# Patient Record
Sex: MALE | Race: WHITE | URBAN - NONMETROPOLITAN AREA
[De-identification: names, ages, dates, MRNs, and addresses within clinical notes are randomized per-mention and may not be internally consistent; named-entity substitution may affect disease eponyms.]

---

## 2017-06-21 ENCOUNTER — AMBULATORY - GICH (OUTPATIENT)
Dept: SCHEDULING | Facility: OTHER | Age: 62
End: 2017-06-21

## 2017-09-13 ENCOUNTER — COMMUNICATION - GICH (OUTPATIENT)
Dept: FAMILY MEDICINE | Facility: OTHER | Age: 62
End: 2017-09-13

## 2017-09-13 DIAGNOSIS — E78.49 OTHER HYPERLIPIDEMIA: ICD-10-CM

## 2017-09-14 ENCOUNTER — AMBULATORY - GICH (OUTPATIENT)
Dept: LAB | Facility: OTHER | Age: 62
End: 2017-09-14

## 2017-09-14 DIAGNOSIS — Z79.899 OTHER LONG TERM (CURRENT) DRUG THERAPY: ICD-10-CM

## 2017-09-14 LAB
ABSOLUTE BASOPHILS - HISTORICAL: 0 THOU/CU MM
ABSOLUTE EOSINOPHILS - HISTORICAL: 0 THOU/CU MM
ABSOLUTE IMMATURE GRANULOCYTES(METAS,MYELOS,PROS) - HISTORICAL: 0 THOU/CU MM
ABSOLUTE LYMPHOCYTES - HISTORICAL: 1.9 THOU/CU MM (ref 0.9–2.9)
ABSOLUTE MONOCYTES - HISTORICAL: 0.6 THOU/CU MM
ABSOLUTE NEUTROPHILS - HISTORICAL: 3.4 THOU/CU MM (ref 1.7–7)
ALBUMIN SERPL-MCNC: 3.9 G/DL (ref 3.5–5.7)
ALT (SGPT) - HISTORICAL: 31 IU/L (ref 7–52)
BASOPHILS # BLD AUTO: 0.5 %
C-REACTIVE PROTEIN - HISTORICAL: <1 MG/DL
CREAT SERPL-MCNC: 1.08 MG/DL (ref 0.7–1.3)
EOSINOPHIL NFR BLD AUTO: 0.3 %
ERYTHROCYTE [DISTWIDTH] IN BLOOD BY AUTOMATED COUNT: 12.5 % (ref 11.5–15.5)
ERYTHROCYTE [SEDIMENTATION RATE] IN BLOOD: 5 MM/HR
GFR IF NOT AFRICAN AMERICAN - HISTORICAL: >60 ML/MIN/1.73M2
HCT VFR BLD AUTO: 47.8 % (ref 37–53)
HEMOGLOBIN: 16.4 G/DL (ref 13.5–17.5)
IMMATURE GRANULOCYTES(METAS,MYELOS,PROS) - HISTORICAL: 0.5 %
LYMPHOCYTES NFR BLD AUTO: 32.4 % (ref 20–44)
MCH RBC QN AUTO: 31.9 PG (ref 26–34)
MCHC RBC AUTO-ENTMCNC: 34.3 G/DL (ref 32–36)
MCV RBC AUTO: 93 FL (ref 80–100)
MONOCYTES NFR BLD AUTO: 9.6 %
NEUTROPHILS NFR BLD AUTO: 56.7 % (ref 42–72)
PLATELET # BLD AUTO: 203 THOU/CU MM (ref 140–440)
PMV BLD: 9.3 FL (ref 6.5–11)
RED BLOOD COUNT - HISTORICAL: 5.14 MIL/CU MM (ref 4.3–5.9)
WHITE BLOOD COUNT - HISTORICAL: 5.9 THOU/CU MM (ref 4.5–11)

## 2017-09-19 ENCOUNTER — OFFICE VISIT - GICH (OUTPATIENT)
Dept: FAMILY MEDICINE | Facility: OTHER | Age: 62
End: 2017-09-19

## 2017-09-19 ENCOUNTER — HISTORY (OUTPATIENT)
Dept: FAMILY MEDICINE | Facility: OTHER | Age: 62
End: 2017-09-19

## 2017-09-19 DIAGNOSIS — Z00.00 ENCOUNTER FOR GENERAL ADULT MEDICAL EXAMINATION WITHOUT ABNORMAL FINDINGS: ICD-10-CM

## 2017-09-19 DIAGNOSIS — E78.49 OTHER HYPERLIPIDEMIA: ICD-10-CM

## 2017-09-19 DIAGNOSIS — M06.042: ICD-10-CM

## 2017-09-19 DIAGNOSIS — M06.041: ICD-10-CM

## 2017-09-19 LAB
CHOL/HDL RATIO - HISTORICAL: 3.71
CHOLESTEROL TOTAL: 156 MG/DL
GLUCOSE SERPL-MCNC: 95 MG/DL (ref 70–105)
HDLC SERPL-MCNC: 42 MG/DL (ref 23–92)
LDLC SERPL CALC-MCNC: 85 MG/DL
NON-HDL CHOLESTEROL - HISTORICAL: 114 MG/DL
PROVIDER ORDERDED STATUS - HISTORICAL: NORMAL
PSA TOTAL (DIAGNOSTIC) - HISTORICAL: 1.14 NG/ML
TRIGL SERPL-MCNC: 144 MG/DL

## 2017-12-28 NOTE — PROGRESS NOTES
Patient Information     Patient Name MRN Sex Keo Cervantes [id number] Male 1955      Progress Notes by Lior Connors MD at 2017  9:02 AM     Author:  Lior Connors MD Service:  (none) Author Type:  Physician     Filed:  2017 12:42 PM Encounter Date:  2017 Status:  Signed     :  Lior Connors MD (Physician)              SUBJECTIVE:    Keo Oh is a 62 y.o. male who presents for px    HPI: Overall is doing well.  No concerns.  Needs refills.  Plans on retiring in atrial fibrillation few months.  Colonoscopy is now on a every 2 year plan.  Brother  from prostate cancer at age 72 or so.  Patient wants a PSA.    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        COLONOSCOPY SCREENING       TN LAP APPENDECTOMY  2014              SOCIAL HISTORY:  Social History     Social History        Marital status:       Spouse name: N/A     Number of children:  N/A     Years of education:  N/A     Occupational History      Not on file.     Social History Main Topics          Smoking status:   Former Smoker      Quit date:  2008      Smokeless tobacco:   Never Used      Alcohol use   Yes      Comment: a case of beer a week       Drug use:   No      Sexual activity:   Not on file      Other Topics  Concern     Not on file      Social History  Narrative     He is , has two sons, Stephen and eRne, who played for the [employer] baseball team.      He is a [employer] employee        **pre upload 12/12/2012**     FAMILY HISTORY:  Family History      Problem  Relation Age of Onset     Cancer-prostate Father      Stroke Father      Cancer-prostate Brother 65     Cancer-breast Mother       CURRENT MEDICATIONS:   Current Outpatient Prescriptions       Medication  Sig Dispense Refill     aspirin (ECOTRIN) 81 mg enteric coated tablet Take 1 tablet by mouth once daily with a meal.  0     atorvastatin (LIPITOR) 80 mg tablet Take 1 tablet by mouth once daily. 90 tablet 3     CALCIUM CARBONATE/VITAMIN D2 (CALCIUM 600 + D ORAL) Take 1 Tab by mouth once daily.       Cholecalciferol, Vitamin D3, (VITAMIN D-3) 2,000 unit tablet Take 2,000 Units by mouth once daily.       naproxen (NAPROSYN) 250 mg tablet Take 500 mg by mouth 2 times daily with meals.       omega-3 fatty acids-vitamin E (FISH OIL) 1,000 mg cap Take 1 capsule by mouth once daily.       sulfaSALAzine (AZULFIDINE) 500 mg tablet Take 2 tablets by mouth 2 times daily.  0     No current facility-administered medications for this visit.      Medications have been reviewed by me and are current to the best of my knowledge and ability.    ALLERGIES:  Review of patient's allergies indicates no known allergies.    REVIEW OF SYSTEMS:  General: denies any general problems.  Eyes: denies problems  Ears/Nose/Throat: denies problems  Cardiovascular: denies problems  Respiratory: denies problems  Gastrointestinal: denies problems  Genitourinary: denies problems  Musculoskeletal: denies problems  Musculoskeletal: mild aching, he feels is from the rheumatoid arthritis.  Much more active now on the sulfasalazine.  This is managed by Rheumatology.  Skin: denies problems  Neurologic: denies problems  Psychiatric: denies problems  Endocrine: denies problems  Heme/Lymphatic: denies problems  Allergic/Immunologic: denies  problems  PHQ Depression Screening 9/19/2017   Date of PHQ exam (doc flow) 9/19/2017   1. Lack of interest/pleasure 0 - Not at all   2. Feeling down/depressed 0 - Not at all   PHQ-2 TOTAL SCORE 0   Some recent data might be hidden       OBJECTIVE:  /76  Pulse 64  Resp 16  Ht 1.829 m (6')  Wt 121.4 kg (267 lb 9.6 oz)  BMI 36.29 kg/m2  EXAM:   General Appearance: Pleasant, alert, appropriate appearance for age. No acute distress  Head Exam: Normal. Normocephalic, atraumatic.  Eye Exam:  Normal external eye, conjunctiva, lids, cornea. MANDIE.  Ear Exam: Normal TM's bilaterally. Normal auditory canals and external ears. Non-tender.  Nose Exam: Normal external nose, mucus membranes, and septum.  OroPharynx Exam:  Dental hygiene adequate. Normal buccal mucosa. Normal pharynx.  Neck Exam:  Supple, no masses or nodes.  Thyroid Exam: No nodules or enlargement.  Chest/Respiratory Exam: Normal chest wall and respirations. Clear to auscultation.  Cardiovascular Exam: Regular rate and rhythm. S1, S2, no murmur, click, gallop, or rubs.  Gastrointestinal Exam: Soft, non-tender, no masses or organomegaly.  Rectal Exam: Normal sphincter tone. No masses noted.  Lymphatic Exam: Non-palpable nodes in neck, clavicular, axillary, or inguinal regions.  Musculoskeletal Exam: Back is straight and non-tender, full ROM of upper and lower extremities.  Skin: no rash or abnormalities  Neurologic Exam: Nonfocal, symmetric DTRs, normal gross motor, tone coordination and no tremor.  Psychiatric Exam: Alert and oriented - appropriate affect.    ASSESSMENT/PLAN:    ICD-10-CM    1. Routine general medical examination at a health care facility Z00.00 PSA, TOTAL      GLUCOSE, FASTING      LIPID PANEL      FLU VACCINE => 3 YRS PF QUADRIVALENT IIV4 IM   2. Other hyperlipidemia E78.4 atorvastatin (LIPITOR) 80 mg tablet   3. Rheumatoid arthritis involving both hands with negative rheumatoid factor (HC) M06.041      M06.042      Mr. Oh's Body  mass index is 36.29 kg/(m^2). This is out of the normal range for a 62 y.o. Normal range for ages 18+ is between 18.5 and 24.9. To lose weight we reviewed risks and benefits of appropriate options such as diet, exercise, and medications. Patient's strategy will be  none; patient is not ready to act  BP Readings from Last 1 Encounters:09/19/17 : 132/76  Mr. Salinas blood pressure is out of the normal range for adults. Per JNC-8 guidelines normal adult blood pressure is < 120/80, pre-hypertensive is between 120/80 and 139/89, and hypertension is 140/90 or greater. Risks of hypertension were discussed. Patient's strategy will be to recheck blood pressure in 12 months    Lior Connors MD ....................  9/19/2017   9:15 AM

## 2017-12-28 NOTE — TELEPHONE ENCOUNTER
Patient Information     Patient Name MRN Keo Montgomery [id number] Male 1955      Telephone Encounter by Sophia Pradhan RN at 2017  4:17 PM     Author:  Sophia Pradhan RN Service:  (none) Author Type:  NURS- Registered Nurse     Filed:  2017  4:23 PM Encounter Date:  2017 Status:  Signed     :  Sophia Pradhan RN (NURS- Registered Nurse)            Statins    Office visit in the past 12 months.    Last visit with BATSHEVA LEIGH was on: 10/20/2015 in [place] [facility]  Next visit with BATSHEVA LEIGH is on: 2017 in [place] [facility]  Next visit with Family Practice is on: 2017 in [facility]    Lab testing requirements:  Lipids annually.  Repeat lipids 6-8 weeks after dosage or drug change.    Last Lipids:  Chol: 256    10/6/2016  T    10/6/2016  HDL:   39    10/6/2016  LDL:  161    10/6/2016  LDL DIRECT:  No results found in past 5 years    .    Concommitant use of fibrates and statins-If it is an addition to the medication list, review note and/or discuss with provider.  If already on medication list, refill.    Max refills 12 months from last office visit.    Pt was to follow up in 6 to 8 weeks after starting Lipitor and this was almost a year ago.RX was a year ago on  for 90 x 3. Limited refill given to pt with a note to see MD to pharmacy for medication. SOPHIA PRADHAN RN ....................  2017   4:21 PM Prescription refilled per RN Medication Refill Policy.................... SOPHIA PRADHAN RN ....................  2017   4:21 PM

## 2017-12-30 NOTE — NURSING NOTE
Patient Information     Patient Name MRN Sex Keo Cervantes N [id number] Male 1955      Nursing Note by Carlie Ragland at 2017  8:45 AM     Author:  Carlie Ragland Service:  (none) Author Type:  (none)     Filed:  2017  8:59 AM Encounter Date:  2017 Status:  Signed     :  Carlie Ragland            Physical with lab work, is fasting  Carlie Ragland ....................  2017   8:51 AM

## 2018-01-27 VITALS
SYSTOLIC BLOOD PRESSURE: 132 MMHG | HEIGHT: 72 IN | WEIGHT: 267.6 LBS | BODY MASS INDEX: 36.24 KG/M2 | RESPIRATION RATE: 16 BRPM | HEART RATE: 64 BPM | DIASTOLIC BLOOD PRESSURE: 76 MMHG

## 2018-01-30 ENCOUNTER — DOCUMENTATION ONLY (OUTPATIENT)
Dept: FAMILY MEDICINE | Facility: OTHER | Age: 63
End: 2018-01-30

## 2018-01-30 PROBLEM — E66.3 OVERWEIGHT: Status: ACTIVE | Noted: 2018-01-30

## 2018-01-30 PROBLEM — C18.9 MALIGNANT TUMOR OF COLON (H): Status: ACTIVE | Noted: 2018-01-30

## 2018-01-30 PROBLEM — E78.5 HYPERLIPIDEMIA: Status: ACTIVE | Noted: 2018-01-30

## 2018-01-30 PROBLEM — F17.200 NICOTINE ADDICTION: Status: ACTIVE | Noted: 2018-01-30

## 2018-01-30 RX ORDER — SULFASALAZINE 500 MG/1
1000 TABLET ORAL 2 TIMES DAILY
COMMUNITY
Start: 2017-09-19

## 2018-01-30 RX ORDER — NAPROXEN 250 MG/1
500 TABLET ORAL
COMMUNITY

## 2018-01-30 RX ORDER — ASPIRIN 81 MG/1
81 TABLET ORAL
COMMUNITY
Start: 2015-11-24

## 2018-01-30 RX ORDER — CHOLECALCIFEROL (VITAMIN D3) 50 MCG
2000 TABLET ORAL DAILY
COMMUNITY

## 2018-01-30 RX ORDER — ATORVASTATIN CALCIUM 80 MG/1
80 TABLET, FILM COATED ORAL DAILY
COMMUNITY
Start: 2017-09-19 | End: 2018-10-01

## 2018-01-30 RX ORDER — CHLORAL HYDRATE 500 MG
1 CAPSULE ORAL
COMMUNITY

## 2018-07-23 NOTE — PROGRESS NOTES
Patient Information     Patient Name  Keo Oh MRN  [id number] Sex  Male   1955      Letter by Lior Connors MD at      Author:  Lior Connors MD Service:  (none) Author Type:  (none)    Filed:   Encounter Date:  2017 Status:  (Other)           Keo Oh  [address] [facility] [address]          2017    Dear Mr. Oh:    Following are the tests completed during your last clinic visit.  The results of these tests are normal and require no further attention unless otherwise noted.  The cholesterol is now half of what it was last year.    Results for orders placed or performed in visit on 17      PSA, TOTAL      Result  Value Ref Range    PSA TOTAL (DIAGNOSTIC) 1.136 <=3.100 ng/mL   GLUCOSE, FASTING      Result  Value Ref Range    GLUCOSE 95 70 - 105 mg/dL   LIPID PANEL      Result  Value Ref Range    CHOLESTEROL,TOTAL 156 <200 mg/dL    TRIGLYCERIDES 144 <150 mg/dL    HDL CHOLESTEROL 42 23 - 92 mg/dL    NON-HDL CHOLESTEROL 114 <145 mg/dl    CHOL/HDL RATIO            3.71 <4.50                    LDL CHOLESTEROL 85 <100 mg/dL    PROVIDER ORDERED STATUS FASTING          If you have any further questions or problems contact my office at  [phone].    Thank you,    Lior Connors MD

## 2018-10-01 DIAGNOSIS — E78.5 HYPERLIPIDEMIA, UNSPECIFIED HYPERLIPIDEMIA TYPE: Primary | ICD-10-CM

## 2018-10-01 RX ORDER — ATORVASTATIN CALCIUM 80 MG/1
TABLET, FILM COATED ORAL
Qty: 90 TABLET | Refills: 3 | Status: SHIPPED | OUTPATIENT
Start: 2018-10-01 | End: 2019-09-20

## 2019-09-20 DIAGNOSIS — E78.5 HYPERLIPIDEMIA, UNSPECIFIED HYPERLIPIDEMIA TYPE: Primary | ICD-10-CM

## 2019-09-20 NOTE — LETTER
September 25, 2019      Keo Oh  [address]  [place] [address]        Dear Keo,     This letter is to remind you that you are over-due for your annual exam with Lior Connors. Your last comprehensive visit was more than 24 months ago.    Please call the clinic at [phone] to schedule your appointment.    If you are no longer seeing Lior Connors for primary care, please call to let us know. Doing so will remove you from our call/contact list.    Thank you for choosing [facility] and [facility] for your health care needs.    Sincerely,    Refill ELAINE  [facility]

## 2019-09-25 RX ORDER — ATORVASTATIN CALCIUM 80 MG/1
TABLET, FILM COATED ORAL
Qty: 90 TABLET | Refills: 3 | Status: SHIPPED | OUTPATIENT
Start: 2019-09-25

## 2019-09-25 NOTE — TELEPHONE ENCOUNTER
[facility] sent Rx request for the following:      ATORVASTATIN 80 MG TABLET  Sig: TAKE 1 TABLET BY MOUTH ONCE DAILY.  Last Prescription Date:   10/1/18  Last Fill Qty/Refills:         90, R-3    Last Office Visit:              9/9/17 (Physical)  Future Office visit:           None.  Routing refill request to provider for review/approval because:  Statins Protocol Failed9/25 10:38 AM   LDL on file in past 12 months    Recent (12 mo) or future (30 days) visit within the authorizing provider's specialty     Patient is 2 years over-due for annual exam. Unable to reach Pt, to assist her in scheduling appointment, as both numbers listed are missing or invalid. Reminder letter sent.    Unable to complete prescription refill per RN Medication Refill Policy. Erica Miles RN .............. 9/25/2019  11:12 AM

## 2020-06-16 RX ORDER — SULFASALAZINE 500 MG/1
TABLET ORAL
Qty: 360 TABLET | Refills: 1 | OUTPATIENT
Start: 2020-06-16

## 2020-06-16 NOTE — TELEPHONE ENCOUNTER
[facility] sent Rx request for the following:   sulfaSALAzine (AZULFIDINE) 500 MG tablet  Sig:  TAKE 2 TABS BY MOUTH TWICE A     Last Prescription Date:   Historical   Last Fill Qty/Refills:         Historical    Last Office Visit:              9/19/2017   Future Office visit:           None    Medication is reported/historical  Drug not on the G refill protocol     Phone numbers not valid.  Appt reminder letter was sent 9/25/2019    Pt needs appt for evaluation. Will refuse at this time    Julia Henderson RN  ....................  6/16/2020   11:18 AM